# Patient Record
Sex: FEMALE | URBAN - METROPOLITAN AREA
[De-identification: names, ages, dates, MRNs, and addresses within clinical notes are randomized per-mention and may not be internally consistent; named-entity substitution may affect disease eponyms.]

---

## 2020-06-11 ENCOUNTER — OFFICE VISIT (OUTPATIENT)
Dept: URBAN - METROPOLITAN AREA CLINIC 37 | Facility: CLINIC | Age: 60
End: 2020-06-11

## 2020-06-23 ENCOUNTER — OFFICE VISIT (OUTPATIENT)
Dept: URBAN - METROPOLITAN AREA CLINIC 98 | Facility: CLINIC | Age: 60
End: 2020-06-23
Payer: MEDICARE

## 2020-06-23 ENCOUNTER — DASHBOARD ENCOUNTERS (OUTPATIENT)
Age: 60
End: 2020-06-23

## 2020-06-23 DIAGNOSIS — K62.5 RECTAL BLEEDING: ICD-10-CM

## 2020-06-23 DIAGNOSIS — R19.7 DIARRHEA, UNSPECIFIED TYPE: ICD-10-CM

## 2020-06-23 DIAGNOSIS — R10.30 LOWER ABDOMINAL PAIN: ICD-10-CM

## 2020-06-23 DIAGNOSIS — Z99.2 DEPENDENCE ON RENAL DIALYSIS: ICD-10-CM

## 2020-06-23 DIAGNOSIS — N18.6 END STAGE RENAL DISEASE: ICD-10-CM

## 2020-06-23 PROBLEM — 105502003: Status: ACTIVE | Noted: 2020-06-23

## 2020-06-23 PROBLEM — 714152005: Status: ACTIVE | Noted: 2020-06-23

## 2020-06-23 PROCEDURE — G8427 DOCREV CUR MEDS BY ELIG CLIN: HCPCS | Performed by: INTERNAL MEDICINE

## 2020-06-23 PROCEDURE — 99213 OFFICE O/P EST LOW 20 MIN: CPT | Performed by: INTERNAL MEDICINE

## 2020-06-23 PROCEDURE — G8420 CALC BMI NORM PARAMETERS: HCPCS | Performed by: INTERNAL MEDICINE

## 2020-06-23 RX ORDER — ALBUTEROL SULFATE 90 UG/1
AEROSOL, METERED RESPIRATORY (INHALATION)
Qty: 0 | Refills: 0 | Status: ACTIVE | COMMUNITY
Start: 1900-01-01 | End: 1900-01-01

## 2020-06-23 NOTE — HPI-OTHER HISTORIES
Hemodialysis patient  MWF diarrhea hemorrhoids BRRB lower abd pain symptoms x 6 months, not improving

## 2020-06-23 NOTE — PHYSICAL EXAM HENT:
Head, normocephalic, atraumatic, Face, Face within normal limits, Ears, External ears within normal limits, Nose/Nasopharynx, External nose  normal appearance, nares patent, no nasal discharge, Mouth and Throat, Oral cavity appearance normal, Breath odor normal, Lips, Appearance normal
No

## 2020-06-23 NOTE — PHYSICAL EXAM GASTROINTESTINAL
Abdomen , soft, nontender, nondistended , no guarding or rigidity , no masses palpable , normal bowel sounds , Liver and Spleen , no hepatomegaly present , no hepatosplenomegaly , liver nontender , spleen not palpable , Abdomen , soft, nontender, nondistended , no guarding or rigidity , no masses palpable , normal bowel sounds , Liver and Spleen , no hepatomegaly present , no hepatosplenomegaly , liver nontender , spleen not palpable , Rectal , no internal hemorrhoids, rectal masses or bleeding present

## 2020-06-29 LAB
C. DIFFICILE CHEK (GDH), STOOL - QDX: POSITIVE
C. DIFFICILE TOXIN A/B, STOOL - QDX: NEGATIVE
GASTROINTESTINAL PATHOGEN: (no result)

## 2020-06-30 ENCOUNTER — DASHBOARD ENCOUNTERS (OUTPATIENT)
Age: 60
End: 2020-06-30

## 2020-06-30 ENCOUNTER — TELEPHONE ENCOUNTER (OUTPATIENT)
Dept: URBAN - METROPOLITAN AREA CLINIC 92 | Facility: CLINIC | Age: 60
End: 2020-06-30

## 2020-06-30 RX ORDER — VANCOMYCIN HYDROCHLORIDE 125 MG/1
1 CAPSULE CAPSULE ORAL
Qty: 30 | Refills: 0 | OUTPATIENT

## 2020-06-30 RX ORDER — ALBUTEROL SULFATE 90 UG/1
AEROSOL, METERED RESPIRATORY (INHALATION)
Qty: 0 | Refills: 0 | Status: ACTIVE | COMMUNITY
Start: 1900-01-01 | End: 1900-01-01

## 2020-07-02 ENCOUNTER — OFFICE VISIT (OUTPATIENT)
Dept: URBAN - METROPOLITAN AREA CLINIC 37 | Facility: CLINIC | Age: 60
End: 2020-07-02

## 2020-07-02 VITALS — HEIGHT: 68 IN

## 2020-07-02 RX ORDER — ALBUTEROL SULFATE 2.5 MG/3ML
3 ML AS NEEDED SOLUTION RESPIRATORY (INHALATION)
Status: ACTIVE | COMMUNITY

## 2020-07-02 RX ORDER — CALCIUM ACETATE 667 MG/1
AS DIRECTED TABLET ORAL
Status: ACTIVE | COMMUNITY

## 2020-07-02 RX ORDER — ATORVASTATIN CALCIUM 40 MG/1
2 TABLET TABLET, FILM COATED ORAL ONCE A DAY
Status: ACTIVE | COMMUNITY

## 2020-07-02 RX ORDER — BISACODYL 5 MG/1
2 TABLETS TABLET, DELAYED RELEASE ORAL PRN
Status: ACTIVE | COMMUNITY

## 2020-07-02 RX ORDER — CARVEDILOL 3.12 MG/1
1 TABLET WITH FOOD TABLET, FILM COATED ORAL TWICE A DAY
Status: ACTIVE | COMMUNITY

## 2020-07-02 RX ORDER — CLOPIDOGREL 75 MG/1
1 TABLET TABLET, FILM COATED ORAL ONCE A DAY
Status: ACTIVE | COMMUNITY

## 2020-07-02 RX ORDER — GABAPENTIN 300 MG/1
1 CAPSULE CAPSULE ORAL BID
Status: ACTIVE | COMMUNITY

## 2020-07-02 RX ORDER — PANTOPRAZOLE SODIUM 40 MG/1
1 TABLET TABLET, DELAYED RELEASE ORAL ONCE A DAY
Status: ACTIVE | COMMUNITY

## 2020-07-02 RX ORDER — INSULIN LISPRO 100 [IU]/ML
AS DIRECTED INJECTION, SOLUTION INTRAVENOUS; SUBCUTANEOUS
Status: ACTIVE | COMMUNITY

## 2020-07-02 NOTE — HPI-MIGRATED HPI
Interim investigations : Imaging studies: ->  * MRI Abd w/o contrast at [facility] on 4/31/20: 1. Multiple cystic pancreatic lesions measuring up to 3,2 cm in the distal pancreatic body, incompletely characterized but favoring sidebranch IPMN's 2. Cholelithiasis. Evidence for iron deposition within the liver, spleen, and bone marrow, favored to be secondary to hemchromatosis 3. Bilateral renal atrophy, in keeping with chronic renal disease. Multiple simple and complex-appearing cystic lesions statically favoted to be benign  * US Gallbladder at  on 4/19/20: Calcified GS and sludge with GB wall thickening but no definite sonography evidence of acute cholecystitis  * CT scan Abd/pelvis at [facility] on 4/19/20: 1. Hypodense pancreatic mass. 2. Cholelithiasis. Biliary sludge 3. Left renal lesion probably a cyst. There may be lesion lower lobe right kidney 4. Atelectasis vs. infiltrate at both lung bases 5. Asymmetric wall thickening in rectum  * XR Abd at [facility] on 4/16/20: Non-obstructive bowel gas pattern;   Interim investigations : Labs done on: -> 5/15/20 at [facility]: - CMP: Glu 159 (H);  (H); Cr 12.93 (H); Na 131 (L); K 5.4 (H); Cl 95 (L); Alb 37; Tbili 0.3;  (H); ALT 4 (L); AST 63 (H) - CBC: WBC 9.5; RBC 3.34 (L); Hgb 9.3 (L); Hct 31.7 (L); Plt 203 - HBsAg: neg; HBsAb quant: 135.3; HBcAb: neg - Covid-19: neg - Troponin: 0.08 (H);   Initial consultation : Patient is here for -> abdominal pain This is a Televisit and patient has consent for this visit Onset of symptoms: Characteristics: Location: Aggravating factors: Relieving factors: Associated Sx: Medications tried: Family history of GI malignancy:  Tests/evaluations done previously: labs and CT scan at [facility] as below  Patient has an extensive medical history including CAD, HTN, diastolic CHF, DM 2 and ESRD on dialysis MWF. She came to the ER at [facility] several times since 2/2020 for foot infection, chronic back and neck pain, and syncopal episodes   Page 18/65: Abd px resolved but workup did show GS, GB sludge, but no acute cholecystitis. A pancreatic mass was also found, which is most likely cystic Hemochromatosis was found in the imagings;